# Patient Record
Sex: MALE | Race: WHITE | NOT HISPANIC OR LATINO | Employment: FULL TIME | URBAN - METROPOLITAN AREA
[De-identification: names, ages, dates, MRNs, and addresses within clinical notes are randomized per-mention and may not be internally consistent; named-entity substitution may affect disease eponyms.]

---

## 2021-02-03 VITALS — BODY MASS INDEX: 31.5 KG/M2 | WEIGHT: 220 LBS | HEIGHT: 70 IN

## 2021-02-03 ASSESSMENT — MIFFLIN-ST. JEOR: SCORE: 1919.16

## 2021-02-03 NOTE — PATIENT INSTRUCTIONS
Your BMI is Body mass index is 31.57 kg/m .  Weight management is a personal decision.  If you are interested in exploring weight loss strategies, the following discussion covers the approaches that may be successful. Body mass index (BMI) is one way to tell whether you are at a healthy weight, overweight, or obese. It measures your weight in relation to your height.  A BMI of 18.5 to 24.9 is in the healthy range. A person with a BMI of 25 to 29.9 is considered overweight, and someone with a BMI of 30 or greater is considered obese. More than two-thirds of [place] adults are considered overweight or obese.  Being overweight or obese increases the risk for further weight gain. Excess weight may lead to heart disease and diabetes.  Creating and following plans for healthy eating and physical activity may help you improve your health.  Weight control is part of healthy lifestyle and includes exercise, emotional health, and healthy eating habits. Careful eating habits lifelong are the mainstay of weight control. Though there are significant health benefits from weight loss, long-term weight loss with diet alone may be very difficult to achieve- studies show long-term success with dietary management in less than 10% of people. Attaining a healthy weight may be especially difficult to achieve in those with severe obesity. In some cases, medications, devices and surgical management might be considered.  What can you do?  If you are overweight or obese and are interested in methods for weight loss, you should discuss this with your provider.     Consider reducing daily calorie intake by 500 calories.     Keep a food journal.     Avoiding skipping meals, consider cutting portions instead.    Diet combined with exercise helps maintain muscle while optimizing fat loss. Strength training is particularly important for building and maintaining muscle mass. Exercise helps reduce stress, increase energy, and improves fitness.  Increasing exercise without diet control, however, may not burn enough calories to loose weight.       Start walking three days a week 10-20 minutes at a time    Work towards walking thirty minutes five days a week     Eventually, increase the speed of your walking for 1-2 minutes at time    In addition, we recommend that you review healthy lifestyles and methods for weight loss available through the National [employer] of [employer] patient information sites:  http://win.niddk.nih.gov/publications/index.htm    And look into health and wellness programs that may be available through your health insurance provider, employer, local community center, or [employer].    Weight management plan: Patient was referred to their PCP to discuss a diet and exercise plan.

## 2021-02-03 NOTE — PROGRESS NOTES
Darrell is a 40 year old who is being evaluated via a billable video visit.      How would you like to obtain your AVS? Mail a copy  If the video visit is dropped, the invitation should be resent by: Send to e-mail at: [email or web address]  Will anyone else be joining your video visit? No   Jess Shields CMA    Video Start Time: 8:38am      IMPRESSION/PLAN  1. Mild obstructive sleep apnea without hypoxemia   Prior home testing with AHI 11.6, mean O2 93% and jessica 85%. Discussed treatment options including CPAP and oral assistive device. Patient has mom with CPAP and would like to proceed with CPAP.   -CPAP auto titrate 5-15 mmHg  -Follow up one month after start of treatment     SEAMUS Jain is a 40 year old male with history of hyperlipidemia, cervical spine stenosis with DJD, and arthritis of multiple joints who presents today via video visit for frequent nighttime awakening.     Prior sleep testing   12/20/18 home test   AHI: 11.6  Mean SpO2: 93%, jessica 85%   Total sleep time: 406 minutes   BMI: 30.8  No strong positional component     Patient has had trouble with sleep since 2012 or 2013 which led him to get the initial sleep study done in 2018. He was advised at that time he would need a PSG with CPAP titrate to continue with treatment and never was able to complete this recommended test.     His biggest concern with sleep is frequent awakenings. He estimates that he wakes anywhere between 5-20 times a night. He no longer has a bed partner, but when he lived with his wife she noted that his snoring was so bad he was forced to sleep on the couch often for half the night. Also notable is that Cristobal has had several discs replaced in his back and finds it almost impossible to sleep on his back, prefers side sleeping.     Sleep schedule  Week days   Bedtime: 10:30-11pm, sleep onset: 5-10min  Wake time: 6:00am   Weekend  Bedtime: 10:30-11pm, sleep onset: 5-10min  Wake time: 6:00am     He  takes almost no naps. Drinks multiple cups of coffee in the morning. Wakes about once a week with a headache that seems to go away after caffeine. Takes 10mg melatonin nightly and either tylenol PM or aleve PM for pain related to his cervical spinal issues. If he doesn't take the melatonin, he does find it difficult to fall asleep.       Review of Systems   10 point ROS of systems including Constitutional, Eyes, Respiratory, Cardiovascular, Gastroenterology, Genitourinary, Integumentary, Muscularskeletal, Psychiatric were all negative except for pertinent positives noted in my HPI.      OBJECTIVE    No vitals taken for this video visit     Physical Exam  Constitutional:       General: He is not in acute distress.     Appearance: Normal appearance. He is not ill-appearing, toxic-appearing or diaphoretic.   HENT:      Head: Normocephalic and atraumatic.      Right Ear: External ear normal.      Left Ear: External ear normal.      Nose: Nose normal.   Eyes:      Conjunctiva/sclera: Conjunctivae normal.   Pulmonary:      Effort: Pulmonary effort is normal. No respiratory distress.   Skin:     Coloration: Skin is not jaundiced or pale.      Findings: No bruising or erythema.   Neurological:      General: No focal deficit present.      Mental Status: He is alert and oriented to person, place, and time.   Psychiatric:         Mood and Affect: Mood normal.         Behavior: Behavior normal.         Thought Content: Thought content normal.         Judgment: Judgment normal.           Alma Babcock, MS4       Video-Visit Details    Type of service:  Video Visit    Video End Time:9:10am    Originating Location (pt. Location): Home    Distant Location (provider location):  [facility]     Platform used for Video Visit: Ifrah Martinez Disclosure:   Alma CARTAGENA, MS4, am serving as a scribe; to document services personally performed by Dr. Marvin Beauchamp, based on data collection and the  provider's statements to me.     Provider Disclosure:  I,Marvin Beauchamp, agree with above History, Review of Systems, Physical exam and Plan.  I have reviewed the content of the documentation and have edited it as needed. I have personally performed the services documented here and the documentation accurately represents those services and the decisions I have made.      I have spent 30 minutes with this patient today in which greater than 50% of this time was spent in the counseling / coordination of care.      Marvin Beauchamp MD

## 2021-02-04 ENCOUNTER — VIRTUAL VISIT (OUTPATIENT)
Dept: SLEEP MEDICINE | Facility: CLINIC | Age: 41
End: 2021-02-04
Payer: OTHER GOVERNMENT

## 2021-02-04 DIAGNOSIS — G47.33 OSA (OBSTRUCTIVE SLEEP APNEA): Primary | ICD-10-CM

## 2021-02-04 PROCEDURE — 99214 OFFICE O/P EST MOD 30 MIN: CPT | Mod: 95 | Performed by: FAMILY MEDICINE

## 2021-03-07 ENCOUNTER — HEALTH MAINTENANCE LETTER (OUTPATIENT)
Age: 41
End: 2021-03-07

## 2021-10-11 ENCOUNTER — HEALTH MAINTENANCE LETTER (OUTPATIENT)
Age: 41
End: 2021-10-11

## 2022-03-27 ENCOUNTER — HEALTH MAINTENANCE LETTER (OUTPATIENT)
Age: 42
End: 2022-03-27

## 2022-09-25 ENCOUNTER — HEALTH MAINTENANCE LETTER (OUTPATIENT)
Age: 42
End: 2022-09-25

## 2023-05-08 ENCOUNTER — HEALTH MAINTENANCE LETTER (OUTPATIENT)
Age: 43
End: 2023-05-08

## 2024-05-11 ENCOUNTER — HEALTH MAINTENANCE LETTER (OUTPATIENT)
Age: 44
End: 2024-05-11